# Patient Record
Sex: FEMALE | Race: WHITE
[De-identification: names, ages, dates, MRNs, and addresses within clinical notes are randomized per-mention and may not be internally consistent; named-entity substitution may affect disease eponyms.]

---

## 2020-04-21 ENCOUNTER — NURSE TRIAGE (OUTPATIENT)
Dept: OTHER | Facility: CLINIC | Age: 35
End: 2020-04-21

## 2021-04-24 ENCOUNTER — NURSE TRIAGE (OUTPATIENT)
Dept: OTHER | Facility: CLINIC | Age: 36
End: 2021-04-24

## 2021-04-24 NOTE — TELEPHONE ENCOUNTER
Brief description of triage: see below    Triage indicates for patient to go to ED. Pt agreeable to POC. Care advice provided, patient verbalizes understanding; denies any other questions or concerns; instructed to call back for any new or worsening symptoms. This triage is a result of a call to [address] [place] [address]. Please do not respond to the triage nurse through this encounter. Any subsequent communication should be directly with the patient. Reason for Disposition   Constant abdominal pain lasting > 2 hours    Answer Assessment - Initial Assessment Questions  1. LOCATION: \"Where does it hurt? \"       RLQ     2. RADIATION: \"Does the pain shoot anywhere else? \" (e.g., chest, back)      Pt states sometimes the pain travels over to the left and then from belly button to lower right side; states radiating to back as well;    3. ONSET: \"When did the pain begin? \" (e.g., minutes, hours or days ago)       Last night    4. SUDDEN: \"Gradual or sudden onset? \"      Sudden    5. PATTERN \"Does the pain come and go, or is it constant? \"     - If constant: \"Is it getting better, staying the same, or worsening? \"       (Note: Constant means the pain never goes away completely; most serious pain is constant and it progresses)      - If intermittent: \"How long does it last?\" \"Do you have pain now? \"      (Note: Intermittent means the pain goes away completely between bouts)      Constant    6. SEVERITY: \"How bad is the pain? \"  (e.g., Scale 1-10; mild, moderate, or severe)    - MILD (1-3): doesn't interfere with normal activities, abdomen soft and not tender to touch     - MODERATE (4-7): interferes with normal activities or awakens from sleep, tender to touch     - SEVERE (8-10): excruciating pain, doubled over, unable to do any normal activities       8/10 last night, 2/10 today    7. RECURRENT SYMPTOM: \"Have you ever had this type of abdominal pain before? \" If so, ask: \"When was the last time? \" and \"What happened that time? \"       Pt denies    8. CAUSE: \"What do you think is causing the abdominal pain? \"      Wondering if it's her appendix    9. RELIEVING/AGGRAVATING FACTORS: \"What makes it better or worse? \" (e.g., movement, antacids, bowel movement)      Driving over a bump in the car    10. OTHER SYMPTOMS: \"Has there been any vomiting, diarrhea, constipation, or urine problems? \"        Pt denies fever; states nausea and has been taking medicine for it     11. PREGNANCY: \"Is there any chance you are pregnant? \" \"When was your last menstrual period? \"        Pt states she doesn't think so; LMP 2 months ago    Protocols used: ABDOMINAL PAIN - FEMALE-ADULT-[place]

## 2021-05-16 ENCOUNTER — NURSE TRIAGE (OUTPATIENT)
Dept: OTHER | Facility: CLINIC | Age: 36
End: 2021-05-16

## 2021-05-17 NOTE — TELEPHONE ENCOUNTER
Reason for Disposition   [1] Taking antibiotic > 24 hours AND [2] fever > 100.4 F (38.0 C)    Answer Assessment - Initial Assessment Questions  1. SYMPTOM: \"What's the main symptom you're concerned about? \" (e.g., redness, swelling, pain, fever, weakness)      Cellulitis on right upper leg, inner thigh. Concerned for fever. States 100.3 temp by oral probe. Starting today. 2. CELLULITIS LOCATION: \"Where is the cellulitis  located? \" (e.g., hand, arm, foot, leg, face)      Right leg. 3. CELLULITIS  SIZE: \"What is the size of the red area? \" (e.g., inches, centimeters; compare to size of a coin) . Hardness is 1/2 dollar size    4. BETTER-SAME-WORSE: Nohemy Mires you getting better, staying the same, or getting worse compared to the day you started the antibiotics? \"       Same since starting Abx. 5. PAIN: Do you have any pain? \"  If so, \"How bad is the pain? \"  (e.g., Scale 1-10; mild, moderate, or severe)      Rates pain 5/10 - worsening. 6.  FEVER: \"Do you have a fever? \" If so, ask: \"What is it, how was it measured and when did it start? \"      100.3 oral probe, began today. 7. OTHER SYMPTOMS: \"Do you have any other symptoms? \" (e.g., pus coming from a wound, red streaks, weakness)      \"[employer] [place] [employer] [place] - doesn't go all the way up leg, stops by pelvic bone - spreading down around inner thigh. Swollen\"    8. DIAGNOSIS DATE: \"When was the cellulitis diagnosed? \" \"By whom? \"       Thursday in the ED. 9.  ANTIBIOTIC NAME: \"What antibiotic(s) are you taking? \"  \"How many times per day? \" (Be sure the patient is receiving the antibiotic as directed). Cephlaxin 500 mg Q 6 hrs (states only taking 2 doses a day, but did take 3 doses yesterday)    Doxycycline 100 mg Q 12 hours (has taken as directed. Topical ointment Mupriocin 2% TID (applying as directed)     10. ANTIBIOTIC DATE: \"When was the antibiotic started? \"        Started on Friday.     11. FOLLOW-UP APPOINTMENT: \"Do you have follow-up appointment with your doctor? \"        Not for her cellulitis. Protocols used: CELLULITIS ON ANTIBIOTIC FOLLOW-UP CALL-ADULT-    States she is expressing some of the pus from wound. Brief description of triage: low grade fever after 24 hours on Abx for cellulitis. Triage indicates for patient to Be seen in next 4 hours or call provider for secondary triage. Care advice provided, patient verbalizes understanding; denies any other questions or concerns; instructed to call back for any new or worsening symptoms. This triage is a result of a call to [address] [place] [address]. Please do not respond to the triage nurse through this encounter. Any subsequent communication should be directly with the patient.